# Patient Record
Sex: MALE | Race: AMERICAN INDIAN OR ALASKA NATIVE
[De-identification: names, ages, dates, MRNs, and addresses within clinical notes are randomized per-mention and may not be internally consistent; named-entity substitution may affect disease eponyms.]

---

## 2018-05-07 ENCOUNTER — HOSPITAL ENCOUNTER (EMERGENCY)
Dept: HOSPITAL 43 - DL.ED | Age: 48
Discharge: HOME | End: 2018-05-07
Payer: COMMERCIAL

## 2018-05-07 DIAGNOSIS — R10.13: ICD-10-CM

## 2018-05-07 DIAGNOSIS — R07.89: Primary | ICD-10-CM

## 2018-05-07 LAB
CHLORIDE SERPL-SCNC: 106 MMOL/L (ref 101–111)
SODIUM SERPL-SCNC: 137 MMOL/L (ref 135–145)

## 2018-05-07 PROCEDURE — 99285 EMERGENCY DEPT VISIT HI MDM: CPT

## 2018-05-07 PROCEDURE — 85610 PROTHROMBIN TIME: CPT

## 2018-05-07 PROCEDURE — 83690 ASSAY OF LIPASE: CPT

## 2018-05-07 PROCEDURE — G0480 DRUG TEST DEF 1-7 CLASSES: HCPCS

## 2018-05-07 PROCEDURE — 84484 ASSAY OF TROPONIN QUANT: CPT

## 2018-05-07 PROCEDURE — 80053 COMPREHEN METABOLIC PANEL: CPT

## 2018-05-07 PROCEDURE — 85379 FIBRIN DEGRADATION QUANT: CPT

## 2018-05-07 PROCEDURE — 71045 X-RAY EXAM CHEST 1 VIEW: CPT

## 2018-05-07 PROCEDURE — 80305 DRUG TEST PRSMV DIR OPT OBS: CPT

## 2018-05-07 PROCEDURE — 93005 ELECTROCARDIOGRAM TRACING: CPT

## 2018-05-07 PROCEDURE — 85730 THROMBOPLASTIN TIME PARTIAL: CPT

## 2018-05-07 PROCEDURE — 81001 URINALYSIS AUTO W/SCOPE: CPT

## 2018-05-07 PROCEDURE — 85025 COMPLETE CBC W/AUTO DIFF WBC: CPT

## 2018-05-07 PROCEDURE — 36415 COLL VENOUS BLD VENIPUNCTURE: CPT

## 2018-05-07 PROCEDURE — 82150 ASSAY OF AMYLASE: CPT

## 2018-05-07 NOTE — EDM.PDOC
ED HPI GENERAL MEDICAL PROBLEM





- General


Chief Complaint: Chest Pain


Stated Complaint: CHEST PAIN. IN BY SL AMB


Time Seen by Provider: 05/07/18 10:55


Source of Information: Reports: Patient, EMS, Old Records, Provider (Korey HOYOS [facility] [address]), RN, RN Notes Reviewed


History Limitations: Reports: No Limitations





- History of Present Illness


INITIAL COMMENTS - FREE TEXT/NARRATIVE: 


Arrives from [facility] [address] by ambulance with c/o onset of chest pain and 

pressure at 2230HRS last night. Pt also reports he had epigastric pain. Pt took 

Maalox without relief. Pt presented to clinic today and was evaluated by Korey HOYOS. Pt received a GI cocktail without change in his chest pain, but 

did have relief of the epigastric pain. He rated his pain as 5/10, and was 

given Nitroglycerin 0.4mg SL x1 with relief of pain to a 3/10. He was then 

given a second dose and the pain completely resolved. On arrival to the ER the 

chest pain had returned. Admits to mild shortness of breath. Denies nausea, 

vomiting, diarrhea, constipation, cough, wheezing, edema, orthopnea, 

palpitations, or rapid heart rate.


Onset Date: 05/06/18


Onset Time: 22:30


Duration: Constant, Waxing/Waning


Location: Reports: Chest


Quality: Reports: Ache, Pressure


Severity: Moderate


Improves with: Reports: Medication (Nitroglycerin)


Worsens with: Reports: None


Associated Symptoms: Reports: No Other Symptoms


Treatments PTA: Reports: Aspirin, IV/IO, Nitroglycerin


  ** Right Chest


Pain Score (Numeric/FACES): 5





- Related Data


 Allergies











Allergy/AdvReac Type Severity Reaction Status Date / Time


 


No Known Allergies Allergy   Verified 05/07/18 11:29











Home Meds: 


 Home Meds





. [No Known Home Meds]  05/07/18 [History]











Past Medical History


Cardiovascular History: Reports: High Cholesterol


Psychiatric History: Reports: Anxiety, Panic Attack


Endocrine/Metabolic History: Reports: Diabetes, Type II, Obesity/BMI 30+





Social & Family History





- Family History


Family Medical History: Unobtainable


Cardiac: Reports: CAD (father), Heart Failure (CHF father), High Cholesterol, 

Hypertension, MI (father)


Psychiatric: Reports: Anxiety


Endocrine/Metabolic: Reports: Diabetes, type II, Obesity/MBI 30+





- Living Situation & Occupation


Living situation: Reports: with Family


Occupation: Employed





ED ROS GENERAL





- Review of Systems


Review Of Systems: ROS reveals no pertinent complaints other than HPI.





ED EXAM, GENERAL





- Physical Exam


Exam: See Below


Exam Limited By: No Limitations


General Appearance: Alert, WD/WN, No Apparent Distress, Anxious, Obese


Eye Exam: Bilateral Eye: Normal Inspection


Ears: Hearing Grossly Normal


Nose: Normal Inspection, Normal Mucosa, No Blood


Throat/Mouth: Normal Oropharynx, Normal Voice, No Airway Compromise, Other (

extensive chronic dental decay)


Head: Atraumatic, Normocephalic


Neck: Normal Inspection, Supple, Non-Tender, Full Range of Motion.  No: 

Lymphadenopathy (L), Lymphadenopathy (R)


Respiratory/Chest: No Respiratory Distress, Lungs Clear, Normal Breath Sounds, 

No Accessory Muscle Use, Chest Non-Tender


Cardiovascular: Normal Peripheral Pulses, Regular Rate, Rhythm, No Edema, No 

Gallop, No JVD, No Murmur, No Rub


GI/Abdominal: Normal Bowel Sounds, Soft, No Distention, No Abnormal Bruit, 

Tender (mild-mod. epigastric pain to palpation).  No: Guarding, Rigid, Rebound


 (Male) Exam: Deferred


Rectal (Males) Exam: Deferred


Back Exam: Normal Inspection, Full Range of Motion, NT


Extremities: Normal Inspection, Normal Range of Motion, Non-Tender, Normal 

Capillary Refill, No Pedal Edema


Neurological: Alert, Oriented, CN II-XII Intact, Normal Cognition, Normal Gait, 

No Motor/Sensory Deficits


Psychiatric: Anxious


Skin Exam: Warm, Dry, Intact, Normal Color, No Rash





EKG INTERPRETATION


EKG Date: 05/07/18


Time: 11:50


Rhythm: Other (Sinus taty)


Rate (Beats/Min): 54


Axis: Normal


P-Wave: Present


QRS: Normal


ST-T: Normal


QT: Normal


Comparison: NA - No Prior EKG


EKG Interpretation Comments: 


No acute ischemic changes.





Course





- Vital Signs


Last Recorded V/S: 


 Last Vital Signs











Temp  36.6 C   05/07/18 11:19


 


Pulse  61   05/07/18 11:19


 


Resp  18   05/07/18 11:19


 


BP  137/81   05/07/18 11:19


 


Pulse Ox  95   05/07/18 11:19














- Orders/Labs/Meds


Orders: 


 Active Orders 24 hr











 Category Date Time Status


 


 EKG 12 Lead [EKG Documentation Completion] [RC] STAT Care  05/07/18 11:02 

Active


 


 Peripheral IV Care [RC] .AS DIRECTED Care  05/07/18 11:03 Active


 


 Chest 1V Frontal [CR] Stat Exams  05/07/18 11:02 Taken


 


 DRUG SCREEN URINE BIORAD [URCHEM] Stat Lab  05/07/18 11:44 Ordered


 


 UA W/MICROSCOPIC [URIN] Stat Lab  05/07/18 11:44 Ordered


 


 Omeprazole Med  05/07/18 12:17 Once





 20 mg PO ONETIME ONE   


 


 Sodium Chloride 0.9% [Saline Flush] Med  05/07/18 11:02 Active





 10 ml FLUSH ASDIRECTED PRN   


 


 Sucralfate [Carafate] Med  05/07/18 12:17 Once





 1 gm PO ONETIME ONE   


 


 Peripheral IV Insertion Adult [OM.PC] Stat Oth  05/07/18 11:02 Ordered








 Medication Orders





Omeprazole (Omeprazole)  20 mg PO ONETIME ONE


   Stop: 05/07/18 12:18


Sodium Chloride (Saline Flush)  10 ml FLUSH ASDIRECTED PRN


   PRN Reason: Keep Vein Open


Sucralfate (Carafate)  1 gm PO ONETIME ONE


   Stop: 05/07/18 12:18








Labs: 


 Laboratory Tests











  05/07/18 05/07/18 05/07/18 Range/Units





  11:11 11:11 11:11 


 


WBC  7.4    (5.0-10.0)  10^3/uL


 


RBC  5.23    (4.6-6.2)  10^6/uL


 


Hgb  15.5    (14.0-18.0)  g/dL


 


Hct  45.6    (40.0-54.0)  %


 


MCV  87.2    ()  fL


 


MCH  29.6    (27.0-34.0)  pg


 


MCHC  34.0    (33.0-35.0)  g/dL


 


Plt Count  234    (150-450)  10^3/uL


 


Neut % (Auto)  55.9    (42.2-75.2)  %


 


Lymph % (Auto)  32.0    (20.5-50.1)  %


 


Mono % (Auto)  8.0    (2-8)  %


 


Eos % (Auto)  3.6 H    (1.0-3.0)  %


 


Baso % (Auto)  0.5    (0.0-1.0)  %


 


PT   9.2   (9.0-12.0)  SEC


 


INR   0.9   (0.9-1.2)  


 


APTT   27.0   (22.0-34.0)  SEC


 


D-Dimer, Quantitative   < 100   (0-400)  ng/mL


 


Sodium    137  (135-145)  mmol/L


 


Potassium    3.7  (3.6-5.0)  mmol/L


 


Chloride    106  (101-111)  mmol/L


 


Carbon Dioxide    24.0  (21.0-31.0)  mmol/L


 


Anion Gap    10.7  


 


BUN    13  (7-18)  mg/dL


 


Creatinine    0.9  (0.6-1.3)  mg/dL


 


Est Cr Clr Drug Dosing    110.39  mL/min


 


Estimated GFR (MDRD)    > 60  


 


BUN/Creatinine Ratio    14.44  


 


Glucose    132 H  ()  mg/dL


 


Calcium    9.1  (8.4-10.2)  mg/dl


 


Total Bilirubin    0.9  (0.2-1.0)  mg/dL


 


AST    18  (10-42)  IU/L


 


ALT    17  (10-60)  IU/L


 


Alkaline Phosphatase    86  ()  IU/L


 


Troponin I    < 0.02  (0.00-0.02)  ng/ml


 


Total Protein    7.3  (6.7-8.2)  g/dl


 


Albumin    3.6  (3.2-5.5)  g/dl


 


Globulin    3.7  


 


Albumin/Globulin Ratio    0.97  


 


Amylase    56  ()  U/L


 


Lipase    28  (22-51)  U/L


 


Urine Color     (YELLOW)  


 


Urine Appearance     (CLEAR)  


 


Urine pH     (5.0-9.0)  


 


Ur Specific Gravity     (1.005-1.030)  


 


Urine Protein     (NEGATIVE)  


 


Urine Glucose (UA)     (NEGATIVE)  


 


Urine Ketones     (NEGATIVE)  


 


Urine Occult Blood     (NEGATIVE)  


 


Urine Nitrite     (NEGATIVE)  


 


Urine Bilirubin     (NEGATIVE)  


 


Urine Urobilinogen     (0.2-1.0)  mg/dL


 


Ur Leukocyte Esterase     (NEGATIVE)  


 


Urine RBC     /HPF


 


Urine WBC     (0-5/HPF)  /HPF


 


Ur Epithelial Cells     /HPF


 


Urine Bacteria     (0-FEW/HPF)  /HPF


 


Urine Mucus     /LPF


 


Urine Opiates Screen     (NEGATIVE)  


 


Ur Oxycodone Screen     (NEGATIVE)  


 


Urine Methadone Screen     (NEGATIVE)  


 


Ur Barbiturates Screen     (NEGATIVE)  


 


U Tricyclic Antidepress     (NEGATIVE)  


 


Ur Phencyclidine Scrn     (NEGATIVE)  


 


Ur Amphetamine Screen     (NEGATIVE)  


 


U Methamphetamines Scrn     (NEGATIVE)  


 


Urine MDMA Screen     (NEGATIVE)  


 


U Benzodiazepines Scrn     (NEGATIVE)  


 


Urine Cocaine Screen     (NEGATIVE)  


 


U Marijuana (THC) Screen     (NEGATIVE)  


 


Ethyl Alcohol    < 5  mg/dL














  05/07/18 05/07/18 Range/Units





  11:44 11:44 


 


WBC    (5.0-10.0)  10^3/uL


 


RBC    (4.6-6.2)  10^6/uL


 


Hgb    (14.0-18.0)  g/dL


 


Hct    (40.0-54.0)  %


 


MCV    ()  fL


 


MCH    (27.0-34.0)  pg


 


MCHC    (33.0-35.0)  g/dL


 


Plt Count    (150-450)  10^3/uL


 


Neut % (Auto)    (42.2-75.2)  %


 


Lymph % (Auto)    (20.5-50.1)  %


 


Mono % (Auto)    (2-8)  %


 


Eos % (Auto)    (1.0-3.0)  %


 


Baso % (Auto)    (0.0-1.0)  %


 


PT    (9.0-12.0)  SEC


 


INR    (0.9-1.2)  


 


APTT    (22.0-34.0)  SEC


 


D-Dimer, Quantitative    (0-400)  ng/mL


 


Sodium    (135-145)  mmol/L


 


Potassium    (3.6-5.0)  mmol/L


 


Chloride    (101-111)  mmol/L


 


Carbon Dioxide    (21.0-31.0)  mmol/L


 


Anion Gap    


 


BUN    (7-18)  mg/dL


 


Creatinine    (0.6-1.3)  mg/dL


 


Est Cr Clr Drug Dosing    mL/min


 


Estimated GFR (MDRD)    


 


BUN/Creatinine Ratio    


 


Glucose    ()  mg/dL


 


Calcium    (8.4-10.2)  mg/dl


 


Total Bilirubin    (0.2-1.0)  mg/dL


 


AST    (10-42)  IU/L


 


ALT    (10-60)  IU/L


 


Alkaline Phosphatase    ()  IU/L


 


Troponin I    (0.00-0.02)  ng/ml


 


Total Protein    (6.7-8.2)  g/dl


 


Albumin    (3.2-5.5)  g/dl


 


Globulin    


 


Albumin/Globulin Ratio    


 


Amylase    ()  U/L


 


Lipase    (22-51)  U/L


 


Urine Color  Yellow   (YELLOW)  


 


Urine Appearance  Clear   (CLEAR)  


 


Urine pH  6.5   (5.0-9.0)  


 


Ur Specific Gravity  >= 1.030   (1.005-1.030)  


 


Urine Protein  Negative   (NEGATIVE)  


 


Urine Glucose (UA)  Negative   (NEGATIVE)  


 


Urine Ketones  Negative   (NEGATIVE)  


 


Urine Occult Blood  Negative   (NEGATIVE)  


 


Urine Nitrite  Negative   (NEGATIVE)  


 


Urine Bilirubin  Negative   (NEGATIVE)  


 


Urine Urobilinogen  1.0   (0.2-1.0)  mg/dL


 


Ur Leukocyte Esterase  Negative   (NEGATIVE)  


 


Urine RBC  0-5   /HPF


 


Urine WBC  0-5   (0-5/HPF)  /HPF


 


Ur Epithelial Cells  Few   /HPF


 


Urine Bacteria  Rare   (0-FEW/HPF)  /HPF


 


Urine Mucus  Moderate H   /LPF


 


Urine Opiates Screen   Negative  (NEGATIVE)  


 


Ur Oxycodone Screen   Negative  (NEGATIVE)  


 


Urine Methadone Screen   Negative  (NEGATIVE)  


 


Ur Barbiturates Screen   Positive H  (NEGATIVE)  


 


U Tricyclic Antidepress   Negative  (NEGATIVE)  


 


Ur Phencyclidine Scrn   Negative  (NEGATIVE)  


 


Ur Amphetamine Screen   Negative  (NEGATIVE)  


 


U Methamphetamines Scrn   Negative  (NEGATIVE)  


 


Urine MDMA Screen   Negative  (NEGATIVE)  


 


U Benzodiazepines Scrn   Negative  (NEGATIVE)  


 


Urine Cocaine Screen   Negative  (NEGATIVE)  


 


U Marijuana (THC) Screen   Negative  (NEGATIVE)  


 


Ethyl Alcohol    mg/dL








*Barbiturate positive: GI cocktail (phenobarb-donnatol) prior to arrival at 

[facility] [address].


Meds: 


Medications











Generic Name Dose Route Start Last Admin





  Trade Name Freq  PRN Reason Stop Dose Admin


 


Omeprazole  20 mg  05/07/18 12:17  





  Omeprazole  PO  05/07/18 12:18  





  ONETIME ONE   





     





     





     





     


 


Sodium Chloride  10 ml  05/07/18 11:02  





  Saline Flush  FLUSH   





  ASDIRECTED PRN   





  Keep Vein Open   





     





     





     


 


Sucralfate  1 gm  05/07/18 12:17  





  Carafate  PO  05/07/18 12:18  





  ONETIME ONE   





     





     





     





     














- Radiology Interpretation


Free Text/Narrative:: 


AP CXR: no acute process; see Rad. report.








Departure





- Departure


Time of Disposition: 12:21


Disposition: Home, Self-Care 01


Condition: Good


Clinical Impression: 


 Atypical chest pain, Epigastric abdominal pain





Instructions:  Nonspecific Chest Pain, Gastritis, Adult, Easy-to-Read


Forms:  ED Department Discharge


Additional Instructions: 


Rx: Carafate 1g


Rx: Omeprazole 20mg


Avoid spicy foods, greasy/fried foods, and alcohol.


Follow up in clinic for recheck and consideration of referral for upper 

endoscopy, and cardiac stress test.





- My Orders


Last 24 Hours: 


My Active Orders





05/07/18 11:02


EKG 12 Lead [EKG Documentation Completion] [RC] STAT 


Chest 1V Frontal [CR] Stat 


Sodium Chloride 0.9% [Saline Flush]   10 ml FLUSH ASDIRECTED PRN 


Peripheral IV Insertion Adult [OM.PC] Stat 





05/07/18 11:03


Peripheral IV Care [RC] .AS DIRECTED 





05/07/18 11:44


DRUG SCREEN URINE BIORAD [URCHEM] Stat 


UA W/MICROSCOPIC [URIN] Stat 





05/07/18 12:17


Omeprazole   20 mg PO ONETIME ONE 


Sucralfate [Carafate]   1 gm PO ONETIME ONE 














- Assessment/Plan


Last 24 Hours: 


My Active Orders





05/07/18 11:02


EKG 12 Lead [EKG Documentation Completion] [RC] STAT 


Chest 1V Frontal [CR] Stat 


Sodium Chloride 0.9% [Saline Flush]   10 ml FLUSH ASDIRECTED PRN 


Peripheral IV Insertion Adult [OM.PC] Stat 





05/07/18 11:03


Peripheral IV Care [RC] .AS DIRECTED 





05/07/18 11:44


DRUG SCREEN URINE BIORAD [URCHEM] Stat 


UA W/MICROSCOPIC [URIN] Stat 





05/07/18 12:17


Omeprazole   20 mg PO ONETIME ONE 


Sucralfate [Carafate]   1 gm PO ONETIME ONE

## 2018-05-08 NOTE — EKG
05/07/2018- DILMA JANSEN -

 

EKG per my reading, shows sinus bradycardia at the rate of 54.

 

DD:  05/08/2018 13:33:36

DT:  05/08/2018 13:58:52

MODL

Job #:  [id number]